# Patient Record
(demographics unavailable — no encounter records)

---

## 2024-11-25 NOTE — PROCEDURE
[Transvaginal OB Sonogram] : Transvaginal OB Sonogram [Intrauterine Pregnancy] : intrauterine pregnancy [Yolk Sac] : yolk sac present [Fetal Heart] : fetal heart present [CRL: ___ (mm)] : CRL - [unfilled]Umm [Date: ___] : Date: [unfilled] [Current GA by Sonogram: ___ (wks)] : Current GA by Sonogram: [unfilled]Uwks [___ day(s)] : [unfilled] days [FreeTextEntry1] : As per Dr. Flowers, this ultrasound was performed. The patient was seen for 7w1d gestation of pregnancy, according to LMP. A single intrauterine pregnancy is seen. A gestational sac and a yolk sac are visualized. There is a fetal pole with a CRL measuring 7w3d (12.6 mm). DAVID should remain 07/13/2025, consistent with LMP. FHR is 151 bpm. Bilateral ovaries not visualized. There is an LEONARD visualized measuring: 1.60 x 1.60 x 0.76 cm There are fibroids visualized, see below for locations and sizes: Fundal Subserosal (Partially Calcified): 2.09 x 2.23 x 2.28 cm Posterior intramural (Partially Calcified): 2.70 x 2.33 x 2.84 cm

## 2024-11-27 NOTE — HISTORY OF PRESENT ILLNESS
[Currently Active] : currently active [Men] : men [Vaginal] : vaginal [Y] : Positive pregnancy history [TextBox_4] : Past obstetrical history-  August 19, 2018- full-term normal spontaneous vaginal delivery, female infant, 7 pounds 14 ounces, had a retained placenta that led to significant bleeding and emergency D&C at [facility]  October 6, 2021-full-term normal spontaneous vaginal delivery, female infant, had a sonogram after vaginal delivery to reassure no retained products of conception.  Was an induction of labor and only required 1 dose of Cytotec and went into rapid labor.  Delivered spontaneously without any epidural or other intervention. [PapSmeardate] : 1/24 [TextBox_31] : Negative [LMPDate] : 10/06/24 [PGHxTotal] : 3 [[facility]] : 2 [BannerxFullTerm] : 2 [FreeTextEntry1] :  x 2

## 2024-11-27 NOTE — PLAN
[FreeTextEntry1] : Transvaginal ultrasound today confirms viable intrauterine pregnancy measuring consistent with dates.  EDC therefore is 7/13/2025. Reviewed my obstetrical practice here at [facility] and affiliation with [facility] and the laborist program there. Patient is advanced maternal age with this pregnancy so I reviewed the high risk nature of this and the recommendations for genetic counseling to review options for both sequential screen and NIPT screening as well as more diagnostic screenings.  Patient last pregnancy just had NIPT screening so we will likely proceed with sequential with NIPT.  I also then reviewed increased risk for preeclampsia and recommended baby aspirin until 36 weeks.  I also told her we will be sending her for an early GCT as well as likely starting nonstress testing every week starting at 36 weeks and then delivery by EDC.  Patient had induction of labor last time and is open to having another induction of labor. Last induction of labor went very quickly; only was given 1 dose of Cytotec and went into active labor and delivered without even being able to get an epidural placed.  I explained to her that this time if she has induction, can place epidural before starting the induction. Referral given for NT sonogram and anatomy sonogram to start making arrangements for those appointments. Initial obstetrical booklet reviewed and prescription for initial obstetrical labs input into the system.  Will have patient return in 2 weeks to have repeat sonogram by me to confirm continued viability and then she will go to the lab for blood work. Flu vaccine discussed and she will give consideration to this. Pap is not due until January.

## 2024-11-27 NOTE — PLAN
[FreeTextEntry1] : Transvaginal ultrasound today confirms viable intrauterine pregnancy measuring consistent with dates.  EDC therefore is 7/13/2025. Reviewed my obstetrical practice here at [facility] and affiliation with [facility] and the laborist program there. Patient is advanced maternal age with this pregnancy so I reviewed the high risk nature of this and the recommendations for genetic counseling to review options for both sequential screen and NIPT screening as well as more diagnostic screenings.  Patient last pregnancy just had NIPT screening so we will likely proceed with sequential with NIPT.  I also then reviewed increased risk for preeclampsia and recommended baby aspirin until 36 weeks.  I also told her we will be sending her for an early GCT as well as likely starting nonstress testing every week starting at 36 weeks and then delivery by EDC.  Patient had induction of labor last time and is open to having another induction of labor. Last induction of labor went very quickly; only was given 1 dose of Cytotec and went into active labor and delivered without even being able to get an epidural placed.  I explained to her that this time if she has induction, can place epidural before starting the induction. Referral given for NT sonogram and anatomy sonogram to start making arrangements for those appointments. Initial obstetrical booklet reviewed and prescription for initial obstetrical labs input into the system.  Will have patient return in 2 weeks to have repeat sonogram by me to confirm continued viability and then she will go to the lab for blood work. Flu vaccine discussed and she will give consideration to this. Pap is not due until January.

## 2024-11-27 NOTE — HISTORY OF PRESENT ILLNESS
[Currently Active] : currently active [Men] : men [Vaginal] : vaginal [Y] : Positive pregnancy history [TextBox_4] : Past obstetrical history-  August 19, 2018- full-term normal spontaneous vaginal delivery, female infant, 7 pounds 14 ounces, had a retained placenta that led to significant bleeding and emergency D&C at [facility]  October 6, 2021-full-term normal spontaneous vaginal delivery, female infant, had a sonogram after vaginal delivery to reassure no retained products of conception.  Was an induction of labor and only required 1 dose of Cytotec and went into rapid labor.  Delivered spontaneously without any epidural or other intervention. [PapSmeardate] : 1/24 [TextBox_31] : Negative [LMPDate] : 10/06/24 [PGHxTotal] : 3 [[facility]annerxLiving] : 2 [[facility]] : 2 [FreeTextEntry1] :  x 2

## 2024-11-27 NOTE — HISTORY OF PRESENT ILLNESS
[Currently Active] : currently active [Men] : men [Vaginal] : vaginal [Y] : Positive pregnancy history [TextBox_4] : Past obstetrical history-  August 19, 2018- full-term normal spontaneous vaginal delivery, female infant, 7 pounds 14 ounces, had a retained placenta that led to significant bleeding and emergency D&C at [facility]  October 6, 2021-full-term normal spontaneous vaginal delivery, female infant, had a sonogram after vaginal delivery to reassure no retained products of conception.  Was an induction of labor and only required 1 dose of Cytotec and went into rapid labor.  Delivered spontaneously without any epidural or other intervention. [PapSmeardate] : 1/24 [TextBox_31] : Negative [LMPDate] : 10/06/24 [PGHxTotal] : 3 [[facility] : 2 [[facility]] : 2 [FreeTextEntry1] :  x 2

## 2024-11-27 NOTE — PLAN
[FreeTextEntry1] : Transvaginal ultrasound today confirms viable intrauterine pregnancy measuring consistent with dates.  EDC therefore is 7/13/2025. Reviewed my obstetrical practice here at [facility] and affiliation with [facility] and the laborist program there. Patient is advanced maternal age with this pregnancy so I reviewed the high risk nature of this and the recommendations for genetic counseling to review options for both sequential screen and NIPT screening as well as more diagnostic screenings.  Patient last pregnancy just had NIPT screening so we will likely proceed with sequential with NIPT.  I also then reviewed increased risk for preeclampsia and recommended baby aspirin until 36 weeks.  I also told her we will be sending her for an early GCT as well as likely starting nonstress testing every week starting at 36 weeks and then delivery by EDC.  Patient had induction of labor last time and is open to having another induction of labor. Last induction of labor went very quickly; only was given 1 dose of Cytotec and went into active labor and delivered without even being able to get an epidural placed.  I explained to her that this time if she has induction, can place epidural before starting the induction. Referral given for NT sonogram and anatomy sonogram to start making arrangements for those appointments. Initial obstetrical booklet reviewed and prescription for initial obstetrical labs input into the system.  Will have patient return in 2 weeks to have repeat sonogram by me to confirm continued viability and then she will go to the lab for blood work. Flu vaccine discussed and she will give consideration to this. Pap is not due until January.